# Patient Record
Sex: MALE | URBAN - METROPOLITAN AREA
[De-identification: names, ages, dates, MRNs, and addresses within clinical notes are randomized per-mention and may not be internally consistent; named-entity substitution may affect disease eponyms.]

---

## 2022-06-01 ENCOUNTER — OFFICE VISIT (OUTPATIENT)
Dept: UROLOGY | Facility: CLINIC | Age: 47
End: 2022-06-01
Payer: COMMERCIAL

## 2022-06-01 DIAGNOSIS — R33.9 URINARY RETENTION: Primary | ICD-10-CM

## 2022-06-01 PROCEDURE — 99203 OFFICE O/P NEW LOW 30 MIN: CPT | Mod: 25 | Performed by: UROLOGY

## 2022-06-01 PROCEDURE — 51798 US URINE CAPACITY MEASURE: CPT | Performed by: UROLOGY

## 2022-06-01 RX ORDER — EPINEPHRINE 0.3 MG/.3ML
INJECTION SUBCUTANEOUS SEE ADMIN INSTRUCTIONS
COMMUNITY
Start: 2022-02-24

## 2022-06-01 RX ORDER — TAMSULOSIN HYDROCHLORIDE 0.4 MG/1
CAPSULE ORAL
COMMUNITY
Start: 2022-05-25 | End: 2022-06-24

## 2022-06-01 RX ORDER — SERTRALINE HYDROCHLORIDE 100 MG/1
1 TABLET, FILM COATED ORAL DAILY
COMMUNITY

## 2022-06-01 RX ORDER — CIPROFLOXACIN 500 MG/1
1 TABLET, FILM COATED ORAL
COMMUNITY
Start: 2022-05-25 | End: 2022-06-08

## 2022-06-01 RX ORDER — MULTIVIT-MIN/IRON/FOLIC ACID/K 45-800-120
CAPSULE ORAL SEE ADMIN INSTRUCTIONS
COMMUNITY

## 2022-06-01 RX ORDER — TADALAFIL 5 MG/1
1 TABLET ORAL
COMMUNITY

## 2022-06-01 RX ORDER — ALLOPURINOL 300 MG/1
1 TABLET ORAL DAILY
COMMUNITY
Start: 2022-04-28

## 2022-06-01 NOTE — NURSING NOTE
Castillo Jurado's goals for this visit include:   Chief Complaint   Patient presents with     New Patient     Establish care, catheter placed 5/25/22 for retention post bariatric sleeve surgery 4/21/22       He requests these members of his care team be copied on today's visit information:     PCP: No Ref-Primary, Physician    Referring Provider:  No referring provider defined for this encounter.    There were no vitals taken for this visit.    Do you need any medication refills at today's visit?     Monica Sanchez LPN on 6/1/2022 at 1:41 PM

## 2022-06-01 NOTE — PROGRESS NOTES
I am seeing Castillo Jurado in consultation for evaluation of acute urinary retention.    HPI:  Pt is status-post bariatric surgery 4/21/22  Came back to clinic post-op with lower abdomen pain, several weeks after the bariatric surgery.  He was not able to void and had to have a pelaez placed a week ago.  500cc residual urine volume when pelaez inserted, and draining the bladder fixed the abdomen pain.  UA was normal, no evidence of infection 5/25/22  Started Flomax a week ago 0.4mg QD.  Here for TOV.    Normal bmp 5/25/22      CBC 5/25/22 slight anemia.        REVIEW OF SYSTEMS:  General: negative  Skin: negative  Eyes: negative  Ears/Nose/Throat: negative  Respiratory: negative  Cardiovascular: negative  Gastrointestinal: negative  Genitourinary: see HPI  Musculoskeletal: negative  Neurologic: negative  Psychiatric: negative  Hematologic/Lymphatic/Immunologic: negative  Endocrine: negative    PAST MEDICAL HX:  Morbid obesity, status-post bariatric surgery 4/21/22  Erectile dysfunction  depression medication      PAST SURG HX:  Bariatric surgery April 2022.    FAMILY HX:  No family history on file.    SOCIAL HX:       MEDICATIONS:  Current Outpatient Medications   Medication Sig     allopurinol (ZYLOPRIM) 300 MG tablet Take 1 tablet by mouth daily     ciprofloxacin (CIPRO) 500 MG tablet Take 1 tablet by mouth     EPINEPHrine (ANY BX GENERIC EQUIV) 0.3 MG/0.3ML injection 2-pack See Admin Instructions     Multiple Vitamins-Minerals (BARIATRIC MULTIVITAMINS/IRON) CAPS See Admin Instructions     sertraline (ZOLOFT) 100 MG tablet Take 1 tablet by mouth daily     tadalafil (CIALIS) 5 MG tablet Take 1 tablet by mouth     tamsulosin (FLOMAX) 0.4 MG capsule 1 capsule     No current facility-administered medications for this visit.       ALLERGIES:  Indomethacin and Nsaids      GENERAL PHYSICAL EXAM:   There were no vitals taken for this visit.    General: Alert, oriented, nad  Eyes: anicteric, EOMI.  Pulse: regular  Resps:  normal, non-labored.  Abdomen:  nondistended.   exam deferred.     Imaging/labs:  See HPI.    ASSESSMENT:     Acute urinary retention, post-op.    PLAN:    Fill-Pull-Void today ( passed).  ~225cc instilled, voided 200cc.    Continue Flomax    PRN follow-up with urology.      John Smart MD     Urological Surgeon    Additional Coding Information:    Problems:  3 -- one acute uncomplicated illness or injury    Data Reviewed  PVR     Tests ordered/pending: Fill-Pull-Void done today.    Notes from other providers reviewed: N/A     Level of risk:  3 -- low risk (e.g., OTC medication or observation, minor surgery without risks)    Time spent:  12 minutes spent on the date of the encounter doing chart review, history and exam, documentation and further activities per the note